# Patient Record
Sex: MALE | Race: WHITE | NOT HISPANIC OR LATINO | Employment: UNEMPLOYED | URBAN - METROPOLITAN AREA
[De-identification: names, ages, dates, MRNs, and addresses within clinical notes are randomized per-mention and may not be internally consistent; named-entity substitution may affect disease eponyms.]

---

## 2022-11-30 ENCOUNTER — OFFICE VISIT (OUTPATIENT)
Dept: ORTHOPEDIC SURGERY | Facility: CLINIC | Age: 32
End: 2022-11-30

## 2022-11-30 VITALS — WEIGHT: 260 LBS | HEART RATE: 70 BPM | OXYGEN SATURATION: 98 % | HEIGHT: 71 IN | BODY MASS INDEX: 36.4 KG/M2

## 2022-11-30 DIAGNOSIS — M25.511 RIGHT SHOULDER PAIN, UNSPECIFIED CHRONICITY: Primary | ICD-10-CM

## 2022-11-30 PROCEDURE — 99203 OFFICE O/P NEW LOW 30 MIN: CPT | Performed by: ORTHOPAEDIC SURGERY

## 2022-11-30 RX ORDER — LOSARTAN POTASSIUM 100 MG/1
100 TABLET ORAL DAILY
COMMUNITY
Start: 2022-11-09

## 2022-11-30 NOTE — PROGRESS NOTES
"Chief Complaint  Initial Evaluation of the Right Shoulder     Subjective      Christiano Kirkland presents to [facility] for initial evaluation of the right shoulder. He was in the .  He used to carry heavy artillery He has had increase of pain over the last year.  He had injury noted during his  career with repetitive movements and heavy lifting. He is having continued problems with certain movements and continued pain.     Allergies   Allergen Reactions   • Ibuprofen Hives        Social History     Socioeconomic History   • Marital status:    Tobacco Use   • Smoking status: Former     Types: Cigarettes   • Smokeless tobacco: Former   Substance and Sexual Activity   • Alcohol use: Never        Review of Systems     Objective   Vital Signs:   Pulse 70   Ht 180.3 cm (71\")   Wt 118 kg (260 lb)   SpO2 98%   BMI 36.26 kg/m²       Physical Exam  Constitutional:       Appearance: Normal appearance. Patient is well-developed and normal weight.   HENT:      Head: Normocephalic.      Right Ear: Hearing and external ear normal.      Left Ear: Hearing and external ear normal.      Nose: Nose normal.   Eyes:      Conjunctiva/sclera: Conjunctivae normal.   Cardiovascular:      Rate and Rhythm: Normal rate.   Pulmonary:      Effort: Pulmonary effort is normal.      Breath sounds: No wheezing or rales.   Abdominal:      Palpations: Abdomen is soft.      Tenderness: There is no abdominal tenderness.   Musculoskeletal:      Cervical back: Normal range of motion.   Skin:     Findings: No rash.   Neurological:      Mental Status: Patient  is alert and oriented to person, place, and time.   Psychiatric:         Mood and Affect: Mood and affect normal.         Judgment: Judgment normal.       Ortho Exam      RIGHT SHOULDER Dorsal pedal pulse 2+. Posterior tibialis pulse is 2+. Good tone of deltoid, bicep, triceps, wrist extensors and flexors. Sensation intact. Neurovascular Intact. Positive " impingement testing. No swelling. No skin discoloration or muscle atrophy. Full ROM with pain with overhead movement.       Procedures        Imaging Results (Most Recent)     None           Result Review :             Assessment and Plan     Diagnoses and all orders for this visit:    1. Right shoulder pain, unspecified chronicity (Primary)  -     MRI Shoulder Right Without Contrast; Future        Discussed the treatment plan with the patient. Discussed conservative measures as exercises, anti-inflammatory and injection. He needs an MRI to assess further treatment options.     Call or return if worsening symptoms.    Follow Up     After MRI      Patient was given instructions and counseling regarding his condition or for health maintenance advice. Please see specific information pulled into the AVS if appropriate.     Scribed for Maulik Villegas MD by Carli Nassar MA.  11/30/22   10:45 EST      I have personally performed the services described in this document as scribed by the above individual and it is both accurate and complete. Maulik Villegas MD 11/30/22

## 2022-12-24 ENCOUNTER — APPOINTMENT (OUTPATIENT)
Dept: MRI IMAGING | Facility: HOSPITAL | Age: 32
End: 2022-12-24

## 2022-12-28 ENCOUNTER — LAB (OUTPATIENT)
Dept: LAB | Facility: HOSPITAL | Age: 32
End: 2022-12-28
Payer: COMMERCIAL

## 2022-12-28 DIAGNOSIS — Z31.41 FERTILITY TESTING: Primary | ICD-10-CM

## 2022-12-28 LAB
CHARACTER SMN: ABNORMAL
COLLECTION METHOD SMN: ABNORMAL
COLOR SMN: ABNORMAL
COMPLETE EJACULATE: YES
MOTILITY: ABNORMAL
PH SMN: 8.5 [PH] (ref 7–8.5)
SEX ABSTIN DURATION TIME PATIENT: 3 DAYS
SPECIMEN VOL SMN: 3.5 ML (ref 1.5–5)
SPERM COUNT: 0.6 MILLIONS/ML (ref 60–200)
SPERM MORPHOLOGY: ABNORMAL
VISC SMN: ABNORMAL CP

## 2022-12-28 PROCEDURE — 89320 SEMEN ANAL VOL/COUNT/MOT: CPT

## 2023-01-06 ENCOUNTER — LAB REQUISITION (OUTPATIENT)
Dept: LAB | Facility: HOSPITAL | Age: 33
End: 2023-01-06
Payer: COMMERCIAL

## 2023-01-06 DIAGNOSIS — Z31.41 ENCOUNTER FOR FERTILITY TESTING: ICD-10-CM

## 2023-01-06 LAB
CHARACTER SMN: ABNORMAL
COLLECTION METHOD SMN: ABNORMAL
COLOR SMN: ABNORMAL
COMPLETE EJACULATE: YES
MOTILITY: ABNORMAL
PH SMN: 8 [PH] (ref 7–8.5)
SEX ABSTIN DURATION TIME PATIENT: 3 DAYS
SPECIMEN VOL SMN: 2.2 ML (ref 1.5–5)
SPERM COUNT: 1.5 MILLIONS/ML (ref 60–200)
SPERM MORPHOLOGY: ABNORMAL
VISC SMN: ABNORMAL CP

## 2023-01-06 PROCEDURE — 89320 SEMEN ANAL VOL/COUNT/MOT: CPT | Performed by: OBSTETRICS & GYNECOLOGY
